# Patient Record
Sex: FEMALE | Race: WHITE | NOT HISPANIC OR LATINO | Employment: UNEMPLOYED | URBAN - METROPOLITAN AREA
[De-identification: names, ages, dates, MRNs, and addresses within clinical notes are randomized per-mention and may not be internally consistent; named-entity substitution may affect disease eponyms.]

---

## 2024-06-07 ENCOUNTER — OFFICE VISIT (OUTPATIENT)
Dept: INTERNAL MEDICINE | Facility: CLINIC | Age: 34
End: 2024-06-07
Payer: MEDICAID

## 2024-06-07 VITALS
HEART RATE: 86 BPM | SYSTOLIC BLOOD PRESSURE: 120 MMHG | TEMPERATURE: 96.9 F | WEIGHT: 241.8 LBS | OXYGEN SATURATION: 96 % | HEIGHT: 70 IN | DIASTOLIC BLOOD PRESSURE: 68 MMHG | BODY MASS INDEX: 34.62 KG/M2

## 2024-06-07 DIAGNOSIS — E66.09 CLASS 1 OBESITY DUE TO EXCESS CALORIES WITHOUT SERIOUS COMORBIDITY WITH BODY MASS INDEX (BMI) OF 34.0 TO 34.9 IN ADULT: ICD-10-CM

## 2024-06-07 DIAGNOSIS — F39 MOOD DISORDER: ICD-10-CM

## 2024-06-07 DIAGNOSIS — F51.4 NIGHT TERROR: Primary | ICD-10-CM

## 2024-06-07 DIAGNOSIS — M54.30 SCIATIC LEG PAIN: ICD-10-CM

## 2024-06-07 PROCEDURE — 96372 THER/PROPH/DIAG INJ SC/IM: CPT | Performed by: STUDENT IN AN ORGANIZED HEALTH CARE EDUCATION/TRAINING PROGRAM

## 2024-06-07 PROCEDURE — 1160F RVW MEDS BY RX/DR IN RCRD: CPT | Performed by: STUDENT IN AN ORGANIZED HEALTH CARE EDUCATION/TRAINING PROGRAM

## 2024-06-07 PROCEDURE — 99204 OFFICE O/P NEW MOD 45 MIN: CPT | Performed by: STUDENT IN AN ORGANIZED HEALTH CARE EDUCATION/TRAINING PROGRAM

## 2024-06-07 PROCEDURE — 1159F MED LIST DOCD IN RCRD: CPT | Performed by: STUDENT IN AN ORGANIZED HEALTH CARE EDUCATION/TRAINING PROGRAM

## 2024-06-07 RX ORDER — PRAZOSIN HYDROCHLORIDE 1 MG/1
1 CAPSULE ORAL NIGHTLY
Qty: 30 CAPSULE | Refills: 2 | Status: SHIPPED | OUTPATIENT
Start: 2024-06-07

## 2024-06-07 RX ORDER — KETOROLAC TROMETHAMINE 30 MG/ML
30 INJECTION, SOLUTION INTRAMUSCULAR; INTRAVENOUS ONCE
Status: COMPLETED | OUTPATIENT
Start: 2024-06-07 | End: 2024-06-07

## 2024-06-07 RX ORDER — BUPRENORPHINE 2 MG/1
2 TABLET SUBLINGUAL 2 TIMES DAILY
COMMUNITY

## 2024-06-07 RX ORDER — BUPRENORPHINE 8 MG/1
8 TABLET SUBLINGUAL 2 TIMES DAILY
COMMUNITY
Start: 2024-05-24

## 2024-06-07 RX ORDER — MIRTAZAPINE 7.5 MG/1
7.5 TABLET, FILM COATED ORAL NIGHTLY
Qty: 30 TABLET | Refills: 2 | Status: SHIPPED | OUTPATIENT
Start: 2024-06-07

## 2024-06-07 RX ORDER — PREDNISONE 20 MG/1
TABLET ORAL
Qty: 32 TABLET | Refills: 0 | Status: SHIPPED | OUTPATIENT
Start: 2024-06-07 | End: 2024-06-23

## 2024-06-07 RX ADMIN — KETOROLAC TROMETHAMINE 30 MG: 30 INJECTION, SOLUTION INTRAMUSCULAR; INTRAVENOUS at 10:03

## 2024-06-07 NOTE — PROGRESS NOTES
Office Note     Name: Amrita ARBOLEDA    : 1990     MRN: [id number]     Chief Complaint  Med Management    Subjective     History of Present Illness:  Amrita ARBOLEDA is a 33 y.o. female who presents today for     New patient here to establish care.  Originally from [facility]    Currently on Subutex at [address] [place] [address]  Has been undergoing treatment for the past 5 months.      Mood disorder  Reports feeling depression and anxiety.   Says the anxiety is horrible, difficult to go to the grocery store.  Gets paranoid around people, which is why wanted to leave home. Has difficulty trusting people  Has appointment with therapist for grief  She has been on vraylar 3mg daily,     Has night terrors at night which causes disruption of her sleep, prazosin     Complaining acute low back pain and left sciatica leg pain, radiates down her leg. 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     Review of Systems:   Review of Systems   All other systems reviewed and are negative.      Past Medical History:   Past Medical History:   Diagnosis Date    Depression     Headache        Past Surgical History:   Past Surgical History:   Procedure Laterality Date    GALLBLADDER SURGERY         Family History:   Family History   Problem Relation Age of Onset    Migraines Mother     Mental illness Father     Arthritis Father     Migraines Father     Cancer Paternal Grandmother     Alzheimer's disease Paternal Grandmother     Cancer Paternal Grandfather        Social History:   Social History     Socioeconomic History    Marital status: Legally    Tobacco Use    Smoking status: Every Day     Current packs/day: 0.50     Types: Cigarettes   Vaping Use    Vaping status: Every Day    Substances: Nicotine    Devices: Disposable    Passive vaping exposure: Yes   Substance and Sexual Activity    Alcohol use: No    Drug use: No    Sexual activity: Yes     Partners: Male       Immunizations:  "  Immunization History   Administered Date(s) Administered    DTaP, Unspecified 01/20/1998    Hep B, Adolescent or Pediatric 08/13/2003, 09/17/2003, 06/16/2010    Hepatitis A 02/01/2019    IPV 08/13/2003, 06/17/2010    Influenza Seasonal Injectable 10/25/2012    MMR 01/20/1998    OPV 01/20/1998    Td (TDVAX) 08/13/2003        Medications:     Current Outpatient Medications:     buprenorphine (SUBUTEX) 2 MG sublingual tablet SL tablet, Place 1 tablet under the tongue 2 (Two) Times a Day., Disp: , Rfl:     buprenorphine (SUBUTEX) 8 MG sublingual tablet SL tablet, Place 1 tablet under the tongue 2 (Two) Times a Day., Disp: , Rfl:     Cariprazine HCl (Vraylar) 3 MG capsule capsule, Take 1 capsule by mouth Daily., Disp: 30 capsule, Rfl: 2    mirtazapine (REMERON) 7.5 MG tablet, Take 1 tablet by mouth Every Night., Disp: 30 tablet, Rfl: 2    prazosin (MINIPRESS) 1 MG capsule, Take 1 capsule by mouth Every Night., Disp: 30 capsule, Rfl: 2    predniSONE (DELTASONE) 20 MG tablet, Take 3 tablets by mouth Daily for 7 days, THEN 2 tablets Daily for 3 days, THEN 1 tablet Daily for 3 days, THEN 0.5 tablets Daily for 3 days., Disp: 32 tablet, Rfl: 0    Current Facility-Administered Medications:     ketorolac (TORADOL) injection 30 mg, 30 mg, Intramuscular, Once, Johnathan Bustamante MD    Allergies:   Allergies   Allergen Reactions    Codeine Swelling       Objective     Vital Signs  /68   Pulse 86   Temp 96.9 °F (36.1 °C) (Temporal)   Ht 177.8 cm (70\")   Wt 110 kg (241 lb 12.8 oz)   SpO2 96%   BMI 34.69 kg/m²   Estimated body mass index is 34.69 kg/m² as calculated from the following:    Height as of this encounter: 177.8 cm (70\").    Weight as of this encounter: 110 kg (241 lb 12.8 oz).    BMI is >= 30 and <35. (Class 1 Obesity). The following options were offered after discussion;: weight loss educational material (shared in after visit summary), exercise counseling/recommendations, and nutrition " counseling/recommendations      Physical Exam  Constitutional:       Appearance: Normal appearance. She is obese.   Cardiovascular:      Rate and Rhythm: Normal rate and regular rhythm.      Pulses: Normal pulses.      Heart sounds: Normal heart sounds.   Pulmonary:      Effort: Pulmonary effort is normal.      Breath sounds: Normal breath sounds.   Neurological:      Mental Status: She is alert.          Result Review :                  Assessment and Plan     1. Night terror    - mirtazapine (REMERON) 7.5 MG tablet; Take 1 tablet by mouth Every Night.  Dispense: 30 tablet; Refill: 2  - prazosin (MINIPRESS) 1 MG capsule; Take 1 capsule by mouth Every Night.  Dispense: 30 capsule; Refill: 2    2. Sciatic leg pain    - Ambulatory Referral to Physical Therapy  - predniSONE (DELTASONE) 20 MG tablet; Take 3 tablets by mouth Daily for 7 days, THEN 2 tablets Daily for 3 days, THEN 1 tablet Daily for 3 days, THEN 0.5 tablets Daily for 3 days.  Dispense: 32 tablet; Refill: 0  - ketorolac (TORADOL) injection 30 mg    3. Mood disorder  .  Advised may take 4-6 weeks to notice an effect.  Discussed potential side effects including headache, dizziness, GI symptoms, sexual side effects,  worsening mood or suicidal ideations.  Patient advised to call the office if develops any side effects or has questions. Reassess in one month.     - Cariprazine HCl (Vraylar) 3 MG capsule capsule; Take 1 capsule by mouth Daily.  Dispense: 30 capsule; Refill: 2  - mirtazapine (REMERON) 7.5 MG tablet; Take 1 tablet by mouth Every Night.  Dispense: 30 tablet; Refill: 2  - Hemoglobin A1c; Future  - CBC Auto Differential; Future  - Comprehensive Metabolic Panel; Future  - Lipid Panel; Future  - TSH Rfx On Abnormal To Free T4; Future  - Vitamin D,25-Hydroxy; Future    4. Class 1 obesity due to excess calories without serious comorbidity with body mass index (BMI) of 34.0 to 34.9 in adult  Discussed risk associated with obesity. she was given  instructions on calorie counting as well as on decreasing carbohydrate intake. she was also encouraged to start exercise regimen.  Instructed patient to download fitness karina on her smart phone to aid in calorie counting and exercise tracking.         Follow Up  No follow-ups on file.    MD MEAGAN [facility] [address]  [facility]  [address]  [address] [address]  [phone]

## 2024-07-12 ENCOUNTER — READMISSION MANAGEMENT (OUTPATIENT)
Dept: CALL CENTER | Facility: HOSPITAL | Age: 34
End: 2024-07-12
Payer: MEDICAID

## 2024-07-12 NOTE — OUTREACH NOTE
Prep Survey      Flowsheet Row Responses   [facility] facility patient discharged from? Non-BH   Is LACE score < 7 ? Non-BH Discharge   Eligibility Not Eligible   What are the reasons patient is not eligible? Behavioral Health  [SI]   Does the patient have one of the following disease processes/diagnoses(primary or secondary)? Other   Prep survey completed? Yes            Abigail AVALOS - Registered Nurse